# Patient Record
(demographics unavailable — no encounter records)

---

## 2025-02-06 NOTE — HISTORY OF PRESENT ILLNESS
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 Discharged from Inpatient rehab [facility] 3 weeks ago. Overall stable. Needs help on OH and sleep and anxiety. He needs to resume midodrine. He is asking refill for clonazepam. He is requesting for lorazepam which was given from [place] and ran out for going to social events only.  His main concern today continues to be wearing OFF fluctuations. He reports an increase in frequency of dyskinesias. Pt reports he experiences daily OFF periods that last greater then 2.5 hours daily. He appears deconditioned with no current exercise regimen, no PT, OT or speech therapy.  His main problem is early in the morning, when he is severely Off, but he is resistent to taking nighttime medicatins.   Pt was also advised, again to restart Mirtazapine 15 mg QHS for sleep and mood. Pt states he has not started it.   Plan: - Again, add middle of the night dose of Sinemet ER 25/100 in addition to QHS dose - Again, switch back to Sinemet 25/100 2 tab- 2 tab- 2 tab- 2 tab- 1 tab at 8 am- 11 am - 2 pm- 5 pm- 8 pm - Comtan 200 mg at 8 am with 4 of the doses - Amantadine qhs Alprazolam 0.25 mg prn only for social events.  Other medications prescribed Did not try Mirtazapine Pt has not complied with many medications. He has stopped clonazepam, mirtazapine, modafinil.

## 2025-02-06 NOTE — ASSESSMENT
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 Discharged from Inpatient rehab [facility] 3 weeks ago. Overall stable. Needs help on OH and sleep and anxiety. He needs to resume midodrine. He is asking refill for clonazepam. He is requesting for lorazepam which was given from [place] and ran out for going to social events only.  His main concern today continues to be wearing OFF fluctuations. He reports an increase in frequency of dyskinesias. Pt reports he experiences daily OFF at 11:30 am and 2 pm. He appears deconditioned with no current exercise regimen, no PT, OT or speech therapy.  In the last visit, pt was advised to again, start Comtan 200 mg at 8 am, switch back to Parcopa 2 tab- 2 tab- 2 tab- 2 tab- 1 tab at 8 am- 11 am - 2 pm- 5 pm- 8 pm and add middle of the night dose of Sinemet ER 25/100 in addition to QHS dose. Pt is not using this scheduled and instead taking Parcopa 2 tabs at 8:30 am, 12 pm, 3 pm, 7 pm and Sinemet ER 25/100 at 10 pm. Pt was also advised, again to restart Mirtazapine 15 mg QHS for sleep and mood. Pt states he has not started it.  Pt has not complied with many medications. He has stopped clonazepam, mirtazapine, modafinil.  Plan G20.B2 Dyskinesia due to Parkinson's Disease   Discussed the use of Vyalev foslevodopa pump, which is especially indicated for his severe fluctuations. Will request approval. - Again, add middle of the night doses of Sinemet ER 25/100 in addition to QHS dose. - Continue Sinemet 25/100 2 tab- 2 tab- 2 tab- 2 tab- 1 tab at 8 am- 11 am - 2 pm- 5 pm- 8 pm -Refer for [facility] admission  - Continue Comtan 200 mg at 8 am and continue with all doses  - Continue Alprazolam 0.25 mg prn  Encouraged to increase exercise and physical activity and maintain an active social and intellectual life.

## 2025-02-06 NOTE — HISTORY OF PRESENT ILLNESS
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 Discharged from Inpatient rehab [facility] 3 weeks ago. Overall stable. Needs help on OH and sleep and anxiety. He needs to resume midodrine. He is asking refill for clonazepam. He is requesting for lorazepam which was given from [place] and ran out for going to social events only.  His main concern today continues to be wearing OFF fluctuations. He reports an increase in frequency of dyskinesias. Pt reports he experiences daily OFF periods that last greater then 2.5 hours daily. He appears deconditioned with no current exercise regimen, no PT, OT or speech therapy.  His main problem is early in the morning, when he is severely Off, but he is resistent to taking nighttime medicatins.   Pt was also advised, again to restart Mirtazapine 15 mg QHS for sleep and mood. Pt states he has not started it.   Plan: - Again, add middle of the night dose of Sinemet ER 25/100 in addition to QHS dose - Again, switch back to Sinemet 25/100 2 tab- 2 tab- 2 tab- 2 tab- 1 tab at 8 am- 11 am - 2 pm- 5 pm- 8 pm - Comtan 200 mg at 8 am with 4 of the doses - Amantadine qhs Alprazolam 0.25 mg prn only for social events.  Other medications prescribed Did not try Mirtazapine Pt has not complied with many medications. He has stopped clonazepam, mirtazapine, modafinil.

## 2025-02-06 NOTE — ASSESSMENT
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 Discharged from Inpatient rehab [facility] 3 weeks ago. Overall stable. Needs help on OH and sleep and anxiety. He needs to resume midodrine. He is asking refill for clonazepam. He is requesting for lorazepam which was given from [place] and ran out for going to social events only.  His main concern today continues to be wearing OFF fluctuations. He reports an increase in frequency of dyskinesias. Pt reports he experiences daily OFF at 11:30 am and 2 pm. He appears deconditioned with no current exercise regimen, no PT, OT or speech therapy.  In the last visit, pt was advised to again, start Comtan 200 mg at 8 am, switch back to Parcopa 2 tab- 2 tab- 2 tab- 2 tab- 1 tab at 8 am- 11 am - 2 pm- 5 pm- 8 pm and add middle of the night dose of Sinemet ER 25/100 in addition to QHS dose. Pt is not using this scheduled and instead taking Parcopa 2 tabs at 8:30 am, 12 pm, 3 pm, 7 pm and Sinemet ER 25/100 at 10 pm. Pt was also advised, again to restart Mirtazapine 15 mg QHS for sleep and mood. Pt states he has not started it.  Pt has not complied with many medications. He has stopped clonazepam, mirtazapine, modafinil.  Plan G20.B2 Dyskinesia due to Parkinson's Disease   Discussed the use of Vyalev foslevodopa pump, which is especially indicated for his severe fluctuations. Will request approval. - Again, add middle of the night doses of Sinemet ER 25/100 in addition to QHS dose. - Continue Sinemet 25/100 2 tab- 2 tab- 2 tab- 2 tab- 1 tab at 8 am- 11 am - 2 pm- 5 pm- 8 pm -Refer for [facility] admission  - Continue Comtan 200 mg at 8 am and continue with all doses  - Continue Alprazolam 0.25 mg prn  Encouraged to increase exercise and physical activity and maintain an active social and intellectual life.

## 2025-02-06 NOTE — PHYSICAL EXAM
[FreeTextEntry1] : Patient with normal cognitive function, mild hypomimia with diminished blinking, reduction of voice volume and dysarthria. There is mild to moderate rigidity with some cogwheeling, present bilaterally, more evident on the right side. Mild intermittent tremor is present in the right hand, and occasionally in the left. There is moderate loss of motor dexterity, with decrement at rapid sequential and rapid alternating movements, also more evident on the right side. Foot tapping is moderately impaired, also with the right side more affected. Posture is mildly stooped, with leaning to the left when sitting. Pt requires pushing up to stand from a chair. Pt has severe FOG and unable to walk today. Mild dyskinesias, more visible in the mouth.